# Patient Record
(demographics unavailable — no encounter records)

---

## 2025-05-19 NOTE — PHYSICAL EXAM
[Normal Appearance] : normal appearance [Normal S1, S2] : normal s1, s2 [No Murmurs] : no murmurs [Clear to Auscultation Bilaterally] : clear to auscultation bilaterally [No Abnormalities] : no abnormalities [Benign] : benign General Sunscreen Counseling: I recommended a broad spectrum sunscreen with a SPF of 30 or higher.  I explained that SPF 30 sunscreens block approximately 97 percent of the sun's harmful rays.  Sunscreens should be applied at least 15 minutes prior to expected sun exposure and then every 2 hours after that as long as sun exposure continues. If swimming or exercising sunscreen should be reapplied every 45 minutes to an hour after getting wet or sweating.  One ounce, or the equivalent of a shot glass full of sunscreen, is adequate to protect the skin not covered by a bathing suit. I also recommended a lip balm with a sunscreen as well. Sun protective clothing can be used in lieu of sunscreen but must be worn the entire time you are exposed to the sun's rays. [No Clubbing] : no clubbing Products Recommended: La Roche-Posay and Elta MD [No Cyanosis] : no cyanosis Detail Level: Zone [No Edema] : no edema [Normal Color/ Pigmentation] : normal color/ pigmentation [No Focal Deficits] : no focal deficits [Oriented x3] : oriented x3 [TextBox_2] : appears to be in distress. substernal chest pain severe even with walking to examination chair [TextBox_54] : tachycardic [TextBox_68] : tachypneic

## 2025-05-19 NOTE — HISTORY OF PRESENT ILLNESS
[Never] : never [TextBox_4] : 55y woman with hx of asthma, here to establish care. She has pain substernal and upper back, severe, stabbing and pleuritic, 10/10 in intensity, accompanied shortness of breath.  She had cough x 6 months, intermittent, no phlegm.  She saw a cardiologist recently and was told 'heart is okay' She was in ER at [facility] in April of 2025 for back pain  Takes albuterol for asthma, as needed.  Family Hx: sister takes shots every 2 months for lung problems -?asthma on biologics

## 2025-05-19 NOTE — ASSESSMENT
[FreeTextEntry1] : # Chest pain  Not in asthma exacerbation, lungs clear to auscultation, no wheezes, in resp distress and pain  EKG: sinus tachycardia Patient sent to ED for further evaluation for ACS and PE r/o with cardiac enzymes and CTA PE study  FU after discharge from ED for further asthma evaluation

## 2025-06-24 NOTE — PROCEDURE
[FreeTextEntry1] : 5/19/25 CT chest with angio: Acute pulmonary emboli identified throughout the lobar, segmental, and subsegmental divisions of the right pulmonary artery. No evidence of heart strain at this time.Subsegmental consolidations in the right middle and right lower lobes,  indeterminate. Developing pulmonary infarcts not excluded.  TTE 5/20  1. Left ventricular cavity is small. Left ventricular wall thickness is  normal. Left ventricular systolic function is normal with an ejection  fraction of 57 % by Martinez's method of disks. There are no regional wall  motion abnormalities seen.  2. Normal left ventricular diastolic function, with normal left  ventricular filling pressure.  3. Normal right ventricular cavity size, with normal wall thickness, and  normal right ventricular systolic function.  4. Left atrium is mildly dilated.  5. No significant valvular disease.  6. Pulmonary artery systolic pressure could not be estimated.  7. No pericardial effusion seen.  8. No prior echocardiogram is available for comparison.  US DVT 5/20/25 IMPRESSION: No evidence of deep venous thrombosis in either lower extremity.

## 2025-06-24 NOTE — REASON FOR VISIT
[Initial] : an initial visit [Pulmonary Embolism] : pulmonary embolism [TextBox_13] : f/u from hospitalization for PE

## 2025-06-24 NOTE — HISTORY OF PRESENT ILLNESS
[Never] : never [TextBox_4] : 54 yo F hx of asthma, LIBRA a/w PICA, vertigo, fibroid uterus, sent in from ED by pulmonologist, Dr. Riggs, for SOB and pleuritic chest pain to r/o PE vs. ACS. Had been having chronic cough x 6 months intermittently, but substernal and upper back severe stabbing pleuritic 10/10 chest pain accompanied w/ SOB. ECG w/ sinus tachycardia and she was sent to ED. CTA w/ extensive R sided acute PEs and possible infarcts developing. TTE w/out RV strain. HR up to 114, but 97% on RA and normotensive.   Patient denies self or family history of blood clots and rheumatological diseases. She denies recent travel and recent surgeries. She is not on any estrogen therapies. Patient denies self history of malignancy- she reports last colonoscopy was 2020 (due this year), last mammogram 2023 (due this year) and she has an appointment for pap smear this summer.   She took Xarelto for 1 month and stopped last week because there were no refills on the Rx the hospital gave her. Saw Her PCP 6/4 but was still on the Xarelto at the time, did not think to ask for refill. No issues with bleeding. Her chest pain is gone. PCP referred her to hematology and to get another colonoscopy as her last in 2019 showed a polyp. Asking for a hematology referral at [facility].

## 2025-06-24 NOTE — ASSESSMENT
[FreeTextEntry1] : 56 yo F hx of asthma, LIBRA, fibroid uterus, s/p SOB + pleuritic chest pain 2/2 PE 5/19/25, unprovoked and low risk  #Unprovoked PE- has fibroid uterus, but no DVT to suggest compression of vessels as etiology to PE, up to date on cancer screening per pt. PCP referred to hematology but she is requesting to see heme within [facility] system as all her records are here.   Asymptomatic post PE, but stopped a/c after 1 month supply ran out. Discussed importance of at least 6 months of therapy. Resume xarelto and will follow up at 6 month naye to reassess symptoms, can recheck ddimer at 6 month naye and calculate HERDOO2 score. Will consider repeat CTA vs V/Q if she has symptoms.  Plan: - See hematology Dr. Loving or colleague at [facility] - Continue Xarelto 20 mg daily new Rx sent. Should do 6 months of therapy  RTC in 5 months and re-evaluate